# Patient Record
Sex: FEMALE | Race: BLACK OR AFRICAN AMERICAN
[De-identification: names, ages, dates, MRNs, and addresses within clinical notes are randomized per-mention and may not be internally consistent; named-entity substitution may affect disease eponyms.]

---

## 2017-01-01 ENCOUNTER — HOSPITAL ENCOUNTER (EMERGENCY)
Dept: HOSPITAL 61 - ER | Age: 22
LOS: 1 days | Discharge: HOME | End: 2017-01-02
Payer: SELF-PAY

## 2017-01-01 VITALS — WEIGHT: 120.37 LBS | BODY MASS INDEX: 25.97 KG/M2 | HEIGHT: 57 IN

## 2017-01-01 DIAGNOSIS — F12.10: ICD-10-CM

## 2017-01-01 DIAGNOSIS — R10.11: ICD-10-CM

## 2017-01-01 DIAGNOSIS — R10.13: Primary | ICD-10-CM

## 2017-01-01 DIAGNOSIS — Z87.891: ICD-10-CM

## 2017-01-01 DIAGNOSIS — R19.7: ICD-10-CM

## 2017-01-01 DIAGNOSIS — R11.2: ICD-10-CM

## 2017-01-01 LAB
ALBUMIN SERPL-MCNC: 3.7 G/DL (ref 3.4–5)
ALBUMIN/GLOB SERPL: 1.2 {RATIO} (ref 1–1.7)
ALP SERPL-CCNC: 59 U/L (ref 46–116)
ALT SERPL-CCNC: 18 U/L (ref 14–59)
ANION GAP SERPL CALC-SCNC: 9 MMOL/L (ref 6–14)
AST SERPL-CCNC: 11 U/L (ref 15–37)
BASOPHILS # BLD AUTO: 0 X10^3/UL (ref 0–0.2)
BASOPHILS NFR BLD: 0 % (ref 0–3)
BILIRUB SERPL-MCNC: 0.7 MG/DL (ref 0.2–1)
BUN SERPL-MCNC: 12 MG/DL (ref 7–20)
BUN/CREAT SERPL: 13 (ref 6–20)
CALCIUM SERPL-MCNC: 8.5 MG/DL (ref 8.5–10.1)
CHLORIDE SERPL-SCNC: 107 MMOL/L (ref 98–107)
CO2 SERPL-SCNC: 28 MMOL/L (ref 21–32)
CREAT SERPL-MCNC: 0.9 MG/DL (ref 0.6–1)
EOSINOPHIL NFR BLD: 1 % (ref 0–3)
ERYTHROCYTE [DISTWIDTH] IN BLOOD BY AUTOMATED COUNT: 13.2 % (ref 11.5–14.5)
GFR SERPLBLD BASED ON 1.73 SQ M-ARVRAT: 95.6 ML/MIN
GLOBULIN SER-MCNC: 3.1 G/DL (ref 2.2–3.8)
GLUCOSE SERPL-MCNC: 111 MG/DL (ref 70–99)
HCT VFR BLD CALC: 41.6 % (ref 36–47)
HGB BLD-MCNC: 14 G/DL (ref 12–15.5)
LYMPHOCYTES # BLD: 0.8 X10^3/UL (ref 1–4.8)
LYMPHOCYTES NFR BLD AUTO: 12 % (ref 24–48)
MCH RBC QN AUTO: 32 PG (ref 25–35)
MCHC RBC AUTO-ENTMCNC: 34 G/DL (ref 31–37)
MCV RBC AUTO: 94 FL (ref 79–100)
MONOCYTES NFR BLD: 7 % (ref 0–9)
NEUTROPHILS NFR BLD AUTO: 81 % (ref 31–73)
PLATELET # BLD AUTO: 231 X10^3/UL (ref 140–400)
POTASSIUM SERPL-SCNC: 3.4 MMOL/L (ref 3.5–5.1)
PROT SERPL-MCNC: 6.8 G/DL (ref 6.4–8.2)
RBC # BLD AUTO: 4.42 X10^6/UL (ref 3.5–5.4)
SODIUM SERPL-SCNC: 144 MMOL/L (ref 136–145)
WBC # BLD AUTO: 6.9 X10^3/UL (ref 4–11)

## 2017-01-01 PROCEDURE — 85027 COMPLETE CBC AUTOMATED: CPT

## 2017-01-01 PROCEDURE — 83690 ASSAY OF LIPASE: CPT

## 2017-01-01 PROCEDURE — 84484 ASSAY OF TROPONIN QUANT: CPT

## 2017-01-01 PROCEDURE — 81025 URINE PREGNANCY TEST: CPT

## 2017-01-01 PROCEDURE — 80053 COMPREHEN METABOLIC PANEL: CPT

## 2017-01-01 PROCEDURE — 81001 URINALYSIS AUTO W/SCOPE: CPT

## 2017-01-01 PROCEDURE — 36415 COLL VENOUS BLD VENIPUNCTURE: CPT

## 2017-01-01 PROCEDURE — 76705 ECHO EXAM OF ABDOMEN: CPT

## 2017-01-01 NOTE — RAD
PROCEDURE 

Abdomen sonogram. 

 

HISTORY 

Right upper quadrant pain. 

 

TECHNIQUE 

Sonographic imaging of the abdomen was performed. 

 

COMPARISON 

None. 

 

FINDINGS 

The liver is normal in size. No focal hepatic lesion is seen. The common 

bile duct is normal in caliber, measuring 2.7 mm. The gallbladder is 

unremarkable. The right kidney measures 9.8 cm pole to pole and is 

otherwise unremarkable. The pancreas is partially obscured due to bowel 

gas. The aorta and inferior vena cava are unremarkable. 

 

IMPRESSION 

Unremarkable abdomen sonogram, with limited evaluation of the pancreas due

to bowel gas. 

 

Electronically signed by: Kary Alfredo (Jan 01, 2017 23:50:21)

## 2017-01-02 VITALS — SYSTOLIC BLOOD PRESSURE: 101 MMHG | DIASTOLIC BLOOD PRESSURE: 58 MMHG

## 2017-01-02 LAB
BACTERIA #/AREA URNS HPF: (no result) /HPF
BILIRUB UR QL STRIP: (no result)
GLUCOSE UR STRIP-MCNC: NEGATIVE MG/DL
NEG OBC UR: (no result)
NITRITE UR QL STRIP: NEGATIVE
PH UR STRIP: 5.5 [PH]
POS OBC UR: (no result)
PROT UR STRIP-MCNC: NEGATIVE MG/DL
RBC #/AREA URNS HPF: (no result) /HPF (ref 0–2)
SP GR UR STRIP: >=1.03
SQUAMOUS #/AREA URNS LPF: (no result) /LPF
UROBILINOGEN UR-MCNC: 1 MG/DL
WBC #/AREA URNS HPF: (no result) /HPF (ref 0–4)

## 2017-01-02 NOTE — PHYS DOC
Past Medical History


Past Medical History:  No Pertinent History


Past Surgical History:  No Surgical History


Additional Information:  


Pt reports that she quit smoking yesterday.


Alcohol Use:  Occasionally


Drug Use:  Marijuana


Social History Narrative:  Pt reports heavy marijuana use.





Adult General


Chief Complaint


Chief Complaint:  ABDOMINAL PAIN





HPI


HPI


Patient is a 21  year old F who presents with epigastric abd pain with n/v. She 

has a heavy marajauna use hx. her pain is sharp radiated to the RUQ and is 

moderate. no alleviating or exacerbating factors. Associated with watery 

diarrhea.





Review of Systems


Review of Systems





Constitutional: Denies fever or chills 


Eyes: Denies change in visual acuity, redness, or eye pain 


HENT: Denies nasal congestion or sore throat 


Respiratory: Denies cough or shortness of breath 


Cardiovascular: No additional information not addressed in HPI []


GI: Pos for abd pain


: Denies dysuria or hematuria []


Musculoskeletal: Denies back pain or joint pain []


Integument: Denies rash or skin lesions []


Neurologic: Denies headache, focal weakness or sensory changes []


Endocrine: Denies polyuria or polydipsia []





Allergies


Allergies





 Allergies








Coded Allergies Type Severity Reaction Last Updated Verified


 


  No Known Drug Allergies    17 No











Physical Exam


Physical Exam





Constitutional: Well developed, well nourished, no acute distress, non-toxic 

appearance. 


HENT: Normocephalic, atraumatic, bilateral external ears normal, oropharynx 

moist, no oral exudates, nose normal. 


Eyes: PERRLA, EOMI, conjunctiva normal, no discharge.  


Neck: Normal range of motion, no tenderness, supple, no stridor. [] 


Cardiovascular:Heart rate regular rhythm, no murmur 


Lungs & Thorax:  Bilateral breath sounds clear to auscultation 


Abdomen: Bowel sounds normal, soft, no tenderness, no masses, no pulsatile 

masses. 


Skin: Warm, dry, no erythema, no rash.  


Back: No tenderness, no CVA tenderness. [] 


Extremities: No tenderness, no cyanosis, no clubbing, ROM intact, no edema. [] 


Neurologic: Alert and oriented X 3, normal motor function, normal sensory 

function, no focal deficits noted. 


Psychologic: Affect normal, judgement normal, mood normal. []





Current Patient Data


Vital Signs





 Vital Signs








  Date Time  Temp Pulse Resp B/P Pulse Ox O2 Delivery O2 Flow Rate FiO2


 


17 00:39  74  101/58 98 Room Air  


 


17 22:50 98.5  16     





 98.5       








Lab Values





 Laboratory Tests








Test


  17


23:20 17


00:14


 


White Blood Count


  6.9x10^3/uL


(4.0-11.0) 


 


 


Red Blood Count


  4.42x10^6/uL


(3.50-5.40) 


 


 


Hemoglobin


  14.0g/dL


(12.0-15.5) 


 


 


Hematocrit


  41.6%


(36.0-47.0) 


 


 


Mean Corpuscular Volume 94fL ()   


 


Mean Corpuscular Hemoglobin 32pg (25-35)   


 


Mean Corpuscular Hemoglobin


Concent 34g/dL (31-37)


  


 


 


Red Cell Distribution Width


  13.2%


(11.5-14.5) 


 


 


Platelet Count


  231x10^3/uL


(140-400) 


 


 


Neutrophils (%) (Auto) 81% (31-73)  H 


 


Lymphocytes (%) (Auto) 12% (24-48)  L 


 


Monocytes (%) (Auto) 7% (0-9)   


 


Eosinophils (%) (Auto) 1% (0-3)   


 


Basophils (%) (Auto) 0% (0-3)   


 


Neutrophils # (Auto)


  5.6x10^3uL


(1.8-7.7) 


 


 


Lymphocytes # (Auto)


  0.8x10^3/uL


(1.0-4.8)  L 


 


 


Monocytes # (Auto)


  0.5x10^3/uL


(0.0-1.1) 


 


 


Eosinophils # (Auto)


  0.0x10^3/uL


(0.0-0.7) 


 


 


Basophils # (Auto)


  0.0x10^3/uL


(0.0-0.2) 


 


 


Sodium Level


  144mmol/L


(136-145) 


 


 


Potassium Level


  3.4mmol/L


(3.5-5.1)  L 


 


 


Chloride Level


  107mmol/L


() 


 


 


Carbon Dioxide Level


  28mmol/L


(21-32) 


 


 


Anion Gap 9 (6-14)   


 


Blood Urea Nitrogen


  12mg/dL (7-20)


  


 


 


Creatinine


  0.9mg/dL


(0.6-1.0) 


 


 


Estimated GFR


(Cockcroft-Gault) 95.6  


  


 


 


BUN/Creatinine Ratio 13 (6-20)   


 


Glucose Level


  111mg/dL


(70-99)  H 


 


 


Calcium Level


  8.5mg/dL


(8.5-10.1) 


 


 


Total Bilirubin


  0.7mg/dL


(0.2-1.0) 


 


 


Aspartate Amino Transferase


(AST) 11U/L (15-37)


L 


 


 


Alanine Aminotransferase (ALT) 18U/L (14-59)   


 


Alkaline Phosphatase


  59U/L ()


  


 


 


Troponin I Quantitative


  < 0.017ng/mL


(0.000-0.055) 


 


 


Total Protein


  6.8g/dL


(6.4-8.2) 


 


 


Albumin


  3.7g/dL


(3.4-5.0) 


 


 


Albumin/Globulin Ratio 1.2 (1.0-1.7)   


 


Lipase


  127U/L


() 


 


 


Urine Collection Type  Unknown  


 


Urine Color  Cathy  


 


Urine Clarity  Clear  


 


Urine pH  5.5  


 


Urine Specific Gravity  >=1.030  


 


Urine Protein


  


  Negativemg/dL


(NEG-TRACE)


 


Urine Glucose (UA)


  


  Negativemg/dL


(NEG)


 


Urine Ketones (Stick)


  


  Tracemg/dL


(NEG)


 


Urine Blood


  


  Negative (NEG)


 


 


Urine Nitrite


  


  Negative (NEG)


 


 


Urine Bilirubin  Small (NEG)  


 


Urine Urobilinogen Dipstick


  


  1.0mg/dL (0.2


mg/dL)


 


Urine Leukocyte Esterase  Small (NEG)  


 


Urine RBC  Occ/HPF (0-2)  


 


Urine WBC


  


  5-10/HPF (0-4)


 


 


Urine Squamous Epithelial


Cells 


  Mod/LPF  


 


 


Urine Bacteria


  


  Few/HPF


(0-FEW)


 


Urine Mucus  Mod/LPF  


 


Urine Pregnancy Test


  


  Negative (NEG)


 





 Laboratory Tests


17 23:20








 Laboratory Tests


17 23:20














EKG


EKG


[]





Radiology/Procedures


Radiology/Procedures


[facility]


 [address] [place] [address]


 [phone]


 


 IMAGING REPORT





 Signed





PATIENT: CRISTOBAL LOZANO ACCOUNT: [id number] MRN#: [id number]


: 1995 LOCATION: ER AGE: 21


SEX: F EXAM DT: 17 ACCESSION#: [id number]


STATUS: REG ER ORD. PHYSICIAN: VLADIMIR PUGA MD 


REASON: eval gb ruq abd pain


PROCEDURE: ABDOMEN LTD











PROCEDURE 


Abdomen sonogram. 


 


HISTORY 


Right upper quadrant pain. 


 


TECHNIQUE 


Sonographic imaging of the abdomen was performed. 


 


COMPARISON 


None. 


 


FINDINGS 


The liver is normal in size. No focal hepatic lesion is seen. The common 


bile duct is normal in caliber, measuring 2.7 mm. The gallbladder is 


unremarkable. The right kidney measures 9.8 cm pole to pole and is 


otherwise unremarkable. The pancreas is partially obscured due to bowel 


gas. The aorta and inferior vena cava are unremarkable. 


 


IMPRESSION 


Unremarkable abdomen sonogram, with limited evaluation of the pancreas due


to bowel gas. 


 


Electronically signed by: Kary Cary (2017 23:50:21)














DICTATED and SIGNED BY:     KARY CARY MD


DATE:     17 [id number]





CC: VLADIMIR PUGA MD; NO PCP ~


[]





Course & Med Decision Making


Course & Med Decision Making


Pertinent Labs and Imaging studies reviewed. (See chart for details)





20 yo F presenting to the ED with n/v/d with epigastric abd pain. vital signs 

unremarkable. nontender abd. US neg. labs unremarkable. repeat abd exam nl. Pt d

/winsome with PPI and hydrocodone to follow up with pcp in the next 3 days if her 

symptoms did not improve.





Dragon Disclaimer


Dragon Disclaimer


This electronic medical record was generated, in whole or in part, using a 

voice recognition dictation system.





Departure


Departure


Impression:  


 Primary Impression:  


 Nausea & vomiting


 Additional Impression:  


 Epigastric abdominal pain


Disposition:   HOME, SELF-CARE


Condition:  STABLE


Referrals:  


NO PCP (PCP)








MICHAEL FAGAN MD


Patient Instructions:  Abdominal Pain (Nonspecific)





Additional Instructions:


Thank you for allowing us to participate in your care today.





Followup with your primary care physician in 3 days if your symptoms do not 

improve. If you do not have a primary care provider you can ask for a list of 

our primary care providers. Return to the emergency department you have any new 

or concerning findings.





This should be evaluated by the primary care physician and any necessary 

consulting services for continued management within a few days after discharge. 

Return to emergency room if you have any  new or concerning symptoms including 

but not limited to fever, chills, nausea, vomiting, intractable pain, any new 

rashes, chest pain, shortness of air, uncontrolled bleeding, difficulty 

breathing, and/or vision loss.





You may have been prescribed medication that can change in your level of 

thinking and ability to operate machinery. These medications include 

hydrocodone and Ativan. Also, Benadryl has been known to do this as well. Be 

sure to check with your pharmacist and ask if the medications you've prescribed 

can affect your level of consciousness. I recommend not operating heavy 

machinery or driving while on medication such as these.


Scripts


Hydrocodone Bit/Acetaminophen (Hydrocodone-Apap 5-325  **)1 Each Tablet1 Tab PO 

PRN Q6HRS PRN PAIN #15 TAB


   Be careful as this medication may cause you to be drowsy or


   tired. Do not drive on this medication.


   Prov:VLADIMIR PUGA MD         17


Omeprazole 20 Mg Capsule.dr1 Cap PO DAILY #14 CAP


   Prov:VLADIMIR PUGA MD         17





Problem Qualifiers








VLADIMIR PUGA MD 2017 00:47

## 2019-08-26 ENCOUNTER — HOSPITAL ENCOUNTER (EMERGENCY)
Dept: HOSPITAL 61 - ER | Age: 24
Discharge: HOME | End: 2019-08-26
Payer: COMMERCIAL

## 2019-08-26 VITALS — HEIGHT: 57 IN | BODY MASS INDEX: 30.2 KG/M2 | WEIGHT: 140 LBS

## 2019-08-26 VITALS — DIASTOLIC BLOOD PRESSURE: 63 MMHG | SYSTOLIC BLOOD PRESSURE: 118 MMHG

## 2019-08-26 DIAGNOSIS — M65.4: Primary | ICD-10-CM

## 2019-08-26 PROCEDURE — 29125 APPL SHORT ARM SPLINT STATIC: CPT

## 2019-08-26 PROCEDURE — 96372 THER/PROPH/DIAG INJ SC/IM: CPT

## 2019-08-26 PROCEDURE — 99283 EMERGENCY DEPT VISIT LOW MDM: CPT

## 2019-08-26 NOTE — PHYS DOC
Past Medical History


Past Medical History:  No Pertinent History


Past Surgical History:  No Surgical History


Alcohol Use:  Occasionally


Drug Use:  Marijuana





Adult General


Chief Complaint


Chief Complaint:  HAND PROBLEM





HPI


HPI





Patient is a 23  year old female who presents with right wrist pain, and pain at

the base of her thumb. The patient's this happened while she was at work [employer] 

and moving boxes. She rates her pain as 5 out of 10 in severity and took ibupr

ofen around 4:00 PM today.





Review of Systems


Review of Systems





Constitutional: Denies fever or chills []


Eyes: Denies change in visual acuity, redness, or eye pain []


HENT: Denies nasal congestion or sore throat []


Respiratory: Denies cough or shortness of breath []


Cardiovascular: No additional information not addressed in HPI []


GI: Denies abdominal pain, nausea, vomiting, bloody stools or diarrhea []


: Denies dysuria or hematuria []


Musculoskeletal: Reports R hand pain/swelling.


Integument: Denies rash or skin lesions []


Neurologic: Denies headache, focal weakness or sensory changes []


Endocrine: Denies polyuria or polydipsia []





Complete systems were reviewed and found to be within normal limits, except as 

documented in this note.





Allergies


Allergies





Allergies








Coded Allergies Type Severity Reaction Last Updated Verified


 


  No Known Drug Allergies    1/1/17 No











Physical Exam


Physical Exam





Constitutional: Well developed, well nourished, no acute distress, non-toxic 

appearance. []


HENT: Normocephalic, atraumatic, bilateral external ears normal, oropharynx 

moist, no oral exudates, nose normal. []


Eyes: PERRLA, EOMI, conjunctiva normal, no discharge. [] 


Neck: Normal range of motion, no tenderness, supple, no stridor. [] 


Cardiovascular:Heart rate regular rhythm, no murmur []


Lungs & Thorax:  Bilateral breath sounds clear to auscultation []


Abdomen: Bowel sounds normal, soft, no tenderness, no masses, no pulsatile 

masses. [] 


Skin: Warm, dry, no erythema, no rash. [] 


Back: No tenderness, no CVA tenderness. [] 


Extremities: Tenderness to R wrist and edema. +finkelstein. 


Neurologic: Alert and oriented X 3, normal motor function, normal sensory 

function, no focal deficits noted. []


Psychologic: Affect normal, judgement normal, mood normal. []





EKG


EKG


[]





Radiology/Procedures


Radiology/Procedures


[]





Course & Med Decision Making


Course & Med Decision Making


Pertinent Labs and Imaging studies reviewed. (See chart for details)





Has a +finkelstein. Appears to have De Quervain's tenosynovitis. Will give 

Toradol and have placed in wrist splint. Will have follow up with Orthopedics.





Dragon Disclaimer


Dragon Disclaimer


This electronic medical record was generated, in whole or in part, using a voice

 recognition dictation system.





Departure


Departure


Impression:  


   Primary Impression:  


   De Quervain's disease (tenosynovitis)


Disposition:  01 HOME, SELF-CARE


Condition:  STABLE


Referrals:  


NO PCP (PCP)








MARLYN MCDANIEL MD


Patient Instructions:  De Quervain's Disease, De Quervain's Tenosynovitis-

[facility]





Additional Instructions:  


Thank you for visiting [facility]. We appreciate you trusting us 

with your care. If any additional problems come up don't hesitate to return to 

visit us. Please follow up with your primary care provider so they can plan 

additional care if needed and know about the problem that you had. If symptoms 

worsen come back to the Emergency Department. Any concerning symptoms that start

 such as chest pain, shortness of air, weakness or numbness on one side of the 

body, running high fevers or any other concerning symptoms return to the ER.





Please rest your right wrist/hand follow-up with orthopedics regarding this 

condition. Please take naproxen as prescribed. Please avoid using this wrist to 

let it rest.


Scripts


Naproxen (NAPROXEN) 500 Mg Tablet.dr


1 TAB PO BID, #60 TAB 1 Refill


   Prov: JOSE ANDERSON         8/26/19











JOSE ANDERSON          Aug 26, 2019 23:39

## 2021-08-26 ENCOUNTER — HOSPITAL ENCOUNTER (EMERGENCY)
Dept: HOSPITAL 61 - ER | Age: 26
Discharge: HOME | End: 2021-08-26
Payer: COMMERCIAL

## 2021-08-26 VITALS — HEIGHT: 57 IN | BODY MASS INDEX: 26.78 KG/M2 | WEIGHT: 124.12 LBS

## 2021-08-26 VITALS
DIASTOLIC BLOOD PRESSURE: 70 MMHG | SYSTOLIC BLOOD PRESSURE: 128 MMHG | DIASTOLIC BLOOD PRESSURE: 70 MMHG | SYSTOLIC BLOOD PRESSURE: 128 MMHG
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
## 2021-08-26 DIAGNOSIS — S29.012A: Primary | ICD-10-CM

## 2021-08-26 DIAGNOSIS — F17.210: ICD-10-CM
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
## 2021-08-26 DIAGNOSIS — N39.0: ICD-10-CM
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
## 2021-08-26 DIAGNOSIS — R51.9: ICD-10-CM
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
## 2021-08-26 LAB
APTT PPP: YELLOW S
BACTERIA #/AREA URNS HPF: (no result) /HPF
BILIRUB UR QL STRIP: NEGATIVE
FIBRINOGEN PPP-MCNC: (no result) MG/DL
NITRITE UR QL STRIP: NEGATIVE
PH UR STRIP: 5.5 [PH]
PROT UR STRIP-MCNC: NEGATIVE MG/DL
RBC #/AREA URNS HPF: (no result) /HPF (ref 0–2)
UROBILINOGEN UR-MCNC: 0.2 MG/DL
WBC #/AREA URNS HPF: (no result) /HPF (ref 0–4)

## 2021-08-26 PROCEDURE — 87086 URINE CULTURE/COLONY COUNT: CPT

## 2021-08-26 PROCEDURE — 81001 URINALYSIS AUTO W/SCOPE: CPT

## 2021-08-26 PROCEDURE — 99284 EMERGENCY DEPT VISIT MOD MDM: CPT

## 2021-08-26 PROCEDURE — 81025 URINE PREGNANCY TEST: CPT

## 2021-08-26 PROCEDURE — 87147 CULTURE TYPE IMMUNOLOGIC: CPT

## 2021-08-26 PROCEDURE — 74176 CT ABD & PELVIS W/O CONTRAST: CPT

## 2021-08-26 NOTE — RAD
EXAMINATION: CT abdomen and pelvis without IV contrast.



INDICATION:25 years, Female, right flank pain.



TECHNIQUE: Axial CT images of the abdomen and pelvis were obtained. Coronal and sagittal reformatted 
performed.



COMPARISON:  None.



Exposure: One or more of the following individualized dose reduction techniques were utilized for thi
s examination:  

1. Automated exposure control  

2. Adjustment of the mA and/or kV according to patient size  

3. Use of iterative reconstruction technique.



FINDINGS:



LOWER CHEST:

Unremarkable   



ABDOMEN/PELVIS:

Within the limitation of noncontrast exam,



Liver, gallbladder, spleen, pancreas, biliary ducts and kidneys are unremarkable. No bowel obstructio
n or wall thickening. Appendix is not seen with certainty. No pericecal fat stranding to suggest acut
e inflammation. Normal caliber abdominal aorta. No lymphadenopathy. No pneumoperitoneum or ascites. U
nderdistended urinary bladder which limits evaluation. Unremarkable uterus. No discrete pelvic masses
.



MUSCULOSKELETAL: 

No acute osseous process.   



IMPRESSION:

No acute abnormality in the abdomen or pelvis, specifically no obstructive uropathy.



Electronically signed by: Helen Kirkland MD (8/26/2021 11:42 AM) [facility]

## 2021-08-26 NOTE — ED.ADGEN
Past Medical History


Past Medical History:  No Pertinent History


Past Surgical History:  No Surgical History


Smoking Status:  Current Every Day Smoker


Additional Information:  


5-6 cigarettes daily


Alcohol Use:  Occasionally


Drug Use:  Marijuana





General Adult


EDM:


Chief Complaint:  BACK PAIN - NO INJURY





HPI:


HPI:





Patient is a 25-year-old female who arrives ambulatory to the emergency 

department complaining of waking this morning with back pain.  Patient describes

pain in the region of the thoracic musculature bilaterally and states it 

radiates downward.  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 

however she did not work yesterday and denies any history of injury.  Patient 

also states that she has had a slight headache during this time as well.  

Patient does relate that she received her second Covid vaccine yesterday and has

not had any changes otherwise.  She denies fevers, abdominal pain or changes to 

her bowel/bladder habits.  She is awake, alert and nontoxic-appearing.





Review of Systems:


Review of Systems:


Constitutional:   Denies fever or chills. []


Eyes:   Denies change in visual acuity. []


HENT:   Denies nasal congestion or sore throat. [] 


Respiratory:   Denies cough or shortness of breath. [] 


Cardiovascular:   Denies chest pain or edema. [] 


GI:   Denies abdominal pain, nausea, vomiting, bloody stools or diarrhea. [] 


:  Denies dysuria. [] 


Musculoskeletal:   Denies back pain or joint pain. [] 


Integument:   Denies rash. [] 


Neurologic:   Denies headache, focal weakness or sensory changes. [] 


Endocrine:   Denies polyuria or polydipsia. [] 


Lymphatic:  Denies swollen glands. [] 


Psychiatric:  Denies depression or anxiety. []





Allergies:


Allergies:





Allergies








Coded Allergies Type Severity Reaction Last Updated Verified


 


  No Known Drug Allergies    21 No











Physical Exam:


PE:





Constitutional: Well developed, well nourished, no acute distress, non-toxic 

appearance. []


HENT: Normocephalic, atraumatic, bilateral external ears normal, oropharynx 

moist, no oral exudates, nose normal. []


Eyes: PERRLA, EOMI, conjunctiva normal, no discharge. [] 


Neck: Normal range of motion, no tenderness, supple, no stridor. [] 


Cardiovascular:Heart rate regular rhythm, no murmur []


Lungs & Thorax:  Bilateral breath sounds clear to auscultation []


Abdomen: Bowel sounds normal, soft, no tenderness, no masses, no pulsatile 

masses. [] 


Skin: Warm, dry, no erythema, no rash. [] 


Back: Tenderness of the thoracic musculature bilaterally.  No CVA tenderness. []

 


Extremities: No tenderness, no cyanosis, no clubbing, ROM intact, no edema. [] 


Neurologic: Alert and oriented X 3, normal motor function, normal sensory 

function, no focal deficits noted. []


Psychologic: Affect normal, judgement normal, mood normal. []





Current Patient Data:


Labs:





                                Laboratory Tests








Test


 21


09:35 21


09:50


 


Urine Collection Type Unknown   


 


Urine Color Yellow   


 


Urine Clarity Cloudy   


 


Urine pH


 5.5 (<5.0-8.0)


 





 


Urine Specific Gravity


 1.020


(1.000-1.030) 





 


Urine Protein


 Negative mg/dL


(NEG-TRACE) 





 


Urine Glucose (UA)


 Negative mg/dL


(NEG) 





 


Urine Ketones (Stick)


 Trace mg/dL


(NEG) 





 


Urine Blood Small (NEG)   


 


Urine Nitrite


 Negative (NEG)


 





 


Urine Bilirubin


 Negative (NEG)


 





 


Urine Urobilinogen Dipstick


 0.2 mg/dL (0.2


mg/dL) 





 


Urine Leukocyte Esterase Large (NEG)   


 


Urine RBC


 Field obscured


/HPF (0-2) 





 


Urine WBC


 Tntc /HPF


(0-4) 





 


Urine Squamous Epithelial


Cells Mod /LPF  


 





 


Urine Bacteria


 Few /HPF


(0-FEW) 





 


Urine Mucus Slight /LPF   


 


POC Urine HCG, Qualitative


 


 Hcg negative


(Negative)








Vital Signs:





                                   Vital Signs








  Date Time  Temp Pulse Resp B/P (MAP) Pulse Ox O2 Delivery O2 Flow Rate FiO2


 


21 09:36 99.2 111 18 112/69 (81) 98   





 99.2       











EKG:


EKG:


[]





Heart Score:


C/O Chest Pain:  N/A


Risk Factors:


Risk Factors:  DM, Current or recent (<one month) smoker, HTN, HLP, family 

history of CAD, obesity.


Risk Scores:


Score 0 - 3:  2.5% MACE over next 6 weeks - Discharge Home


Score 4 - 6:  20.3% MACE over next 6 weeks - Admit for Clinical Observation


Score 7 - 10:  72.7% MACE over next 6 weeks - Early Invasive Strategies





Radiology/Procedures:


Radiology/Procedures:


[]


Impression:


[facility]


                    [address] [place] [address]


                                 [phone]


                                        


                                 IMAGING REPORT





                                     Signed





PATIENT: CRISTOBAL LOZANO    ACCOUNT: [id number]     MRN#: [id number]


: 1995           LOCATION: ER              AGE: 25


SEX: F                    EXAM DT: 21         ACCESSION#: [id number]


STATUS: REG ER            ORD. PHYSICIAN: EDDI GOEMZ DO


REASON: right flank pain


PROCEDURE: CT ABDOMEN PELVIS WO CONTRAST





EXAMINATION: CT abdomen and pelvis without IV contrast.





INDICATION:25 years, Female, right flank pain.





TECHNIQUE: Axial CT images of the abdomen and pelvis were obtained. Coronal and 

sagittal reformatted performed.





COMPARISON:  None.





Exposure: One or more of the following individualized dose reduction techniques 

were utilized for this examination:  


1. Automated exposure control  


2. Adjustment of the mA and/or kV according to patient size  


3. Use of iterative reconstruction technique.





FINDINGS:





LOWER CHEST:


Unremarkable   





ABDOMEN/PELVIS:


Within the limitation of noncontrast exam,





Liver, gallbladder, spleen, pancreas, biliary ducts and kidneys are 

unremarkable. No bowel obstruction or wall thickening. Appendix is not seen with

 certainty. No pericecal fat stranding to suggest acute inflammation. Normal 

caliber abdominal aorta. No lymphadenopathy. No pneumoperitoneum or ascites. 

Underdistended urinary bladder which limits evaluation. Unremarkable uterus. No 

discrete pelvic masses.





MUSCULOSKELETAL: 


No acute osseous process.   





IMPRESSION:


No acute abnormality in the abdomen or pelvis, specifically no obstructive 

uropathy.





Electronically signed by: Steffi Kirkland MD (2021 11:42 AM) [facility]














DICTATED and SIGNED BY:     STEFFI KIRKLAND MD


DATE:     21 [id number]








Course & Med Decision Making:


Course & Med Decision Making


Pertinent Labs and Imaging studies reviewed. (See chart for details)





[]





Dragon Disclaimer:


Dragon Disclaimer:


This electronic medical record was generated, in whole or in part, using a voice

 recognition dictation system.





Departure


Departure


Impression:  


   Primary Impression:  


   Thoracic myofascial strain


   Additional Impression:  


   UTI (urinary tract infection)


Disposition:   HOME / SELF CARE / HOMELESS


Condition:  GOOD


Referrals:  


NO PCP (PCP)


Patient Instructions:  Thoracic Strain, Urinary Tract Infection


Scripts


Tramadol Hcl (TRAMADOL HCL) 50 Mg Tablet


50 MG PO Q6HRS PRN for PAIN, #14 TAB


   Prov: MEERA SMITH         21 


Cyclobenzaprine Hcl (CYCLOBENZAPRINE HCL) 5 Mg Tablet


1 TAB PO TID for 7 Days, #21 TAB


   Prov: EDDI GOMEZ DO         21 


Nitrofurantoin Monohyd/M-Cryst (MACROBID 100 MG CAPSULE) 100 Mg Capsule


1 CAP PO BID for 7 Days, #14 CAP 0 Refills


   Prov: EDDI GOMEZ DO         21





Problem Qualifiers











EDDI GOMEZ DO               Aug 26, 2021 09:54


MEERA SMITH            Aug 26, 2021 12:08

## 2022-04-20 ENCOUNTER — HOSPITAL ENCOUNTER (OUTPATIENT)
Dept: HOSPITAL 61 - RAD | Age: 27
End: 2022-04-20
Attending: INTERNAL MEDICINE
Payer: COMMERCIAL

## 2022-04-20 DIAGNOSIS — R06.02: Primary | ICD-10-CM

## 2022-04-20 PROCEDURE — 71046 X-RAY EXAM CHEST 2 VIEWS: CPT

## 2022-04-20 NOTE — RAD
EXAM:  XR CHEST 2V 4/20/2022 9:41 AM



CLINICAL INDICATION:  Shortness of breath



COMPARISON:  None



TECHNIQUE:  PA and lateral views of the chest



FINDINGS:  The heart and mediastinum are normal.  Lungs are well-expanded and clear.  No consolidatio
n, pleural effusion, or pneumothorax.  Pulmonary vascularity is normal.  The thoracic skeleton is int
act.



IMPRESSION: Normal chest radiograph.  



Electronically signed by: Farzana Aldridge MD (4/20/2022 10:01 AM) [id number]